# Patient Record
Sex: MALE | Race: WHITE
[De-identification: names, ages, dates, MRNs, and addresses within clinical notes are randomized per-mention and may not be internally consistent; named-entity substitution may affect disease eponyms.]

---

## 2018-05-26 ENCOUNTER — HOSPITAL ENCOUNTER (EMERGENCY)
Dept: HOSPITAL 25 - ED | Age: 68
Discharge: HOME | End: 2018-05-26
Payer: MEDICARE

## 2018-05-26 VITALS — DIASTOLIC BLOOD PRESSURE: 80 MMHG | SYSTOLIC BLOOD PRESSURE: 123 MMHG
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
## 2018-05-26 DIAGNOSIS — S61.210A: Primary | ICD-10-CM
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
## 2018-05-26 PROCEDURE — 12001 RPR S/N/AX/GEN/TRNK 2.5CM/<: CPT

## 2018-05-26 PROCEDURE — 99281 EMR DPT VST MAYX REQ PHY/QHP: CPT

## 2018-05-27 NOTE — ED
Laceration/Wound HPI





- HPI Summary


HPI Summary: 





Patient is a 67-year-old male presenting to the ED with a right index finger 

laceration from a stone.  The area was cleaned and dressed with ice in triage.  

Patient is not on any anticoagulation medications.  The area is approximately 2 

cm in length, 0.2 cm in depth and 0.3 cm at the widest area.  The area of 

laceration extends up into the tip of the finger but does not involve the nail 

or nailbed.  Endorses a mild amount of pain.  Bleeding is controlled on 

arrival.  There is slight bruising, but patient denies any numbness or tingling 

to the area.  He is able to flex and extend at the PIP and DIP.








- History of Current Complaint


Stated Complaint: RT FINGER LAC


Time Seen by Provider: 05/26/18 15:05


Hx Obtained From: Patient


Mechanism of Injury: Sharp/Blunt Trauma


Onset/Duration: Sudden Onset


Aggravating: Movement


Alleviating: Compression


Timing: Constant


Onset Severity: Mild


Current Severity: None


Pain Intensity: 0


Pain Scale Used: 0-10 Numeric


Associated Signs & Symptoms: Negative


Related Hx: Dominant Hand (Right)





- Allergy/Home Medications


Allergies/Adverse Reactions: 


 Allergies











Allergy/AdvReac Type Severity Reaction Status Date / Time


 


No Known Allergies Allergy   Verified 05/26/18 14:48














PMH/Surg Hx/FS Hx/Imm Hx


Previously Healthy: Yes





- Immunization History


Hx Pertussis Vaccination: No


Immunizations Up to Date: Unable to Obtain/Confirm


Infectious Disease History: No


Infectious Disease History: 


   Denies: Traveled Outside the [place] in Last 30 Days





- Social History


Occupation: Employed Full-time


Alcohol Use: Occasionally


Hx Substance Use: No


Substance Use Type: Reports: None


Hx Tobacco Use: No


Smoking Status (MU): Never Smoked Tobacco





Review of Systems


Constitutional: Negative


Negative: Fever, Chills, Fatigue


ENT: Negative


Cardiovascular: Negative


Genitourinary: Negative


Positive: no symptoms reported, see HPI


Musculoskeletal: Negative


Positive: Other - laceration to the index finger


Neurological: Negative


All Other Systems Reviewed And Are Negative: Yes





Physical Exam


Triage Information Reviewed: Yes


Vital Signs On Initial Exam: 


 Initial Vitals











Temp Pulse Resp BP Pulse Ox


 


 97 F   101   16   136/98   100 


 


 05/26/18 14:49  05/26/18 14:49  05/26/18 14:49  05/26/18 14:49  05/26/18 14:49











Vital Signs Reviewed: Yes


Appearance: Positive: Well-Appearing, Well-Nourished


Skin: Positive: Warm, Skin Color Reflects Adequate Perfusion


Head/Face: Positive: Normal Head/Face Inspection


Eyes: Positive: EOMI, KEN


Neck: Positive: Nontender


Respiratory/Lung Sounds: Positive: Breath Sounds Present


Cardiovascular: Positive: Pulses are Symmetrical in both Upper and Lower 

Extremities


Musculoskeletal: Positive: Normal, Strength/ROM Intact - E


Neurological: Positive: Sensory/Motor Intact, Alert, Oriented to Person Place, 

Time, Speech Normal


Psychiatric: Positive: Normal, Affect/Mood Appropriate





Procedures





- Laceration/Wound Repair


  ** 1


Location: upper extremity


Description: Irregular


Anesthesia: 1.0%


Betadine Prep?: No


Irrigated w/ Saline (ccs): 10


Laceration/Wound Explored: clean


Closure: Single Layer


Suture Type: Prolene


Number of Sutures: 7


Layer Closure?: No


Sterile Dressing Applied?: No





Diagnostics





- Vital Signs


 Vital Signs











  Temp Pulse Resp BP Pulse Ox


 


 05/26/18 16:03  97.7 F  71  18  123/80  100


 


 05/26/18 14:49  97 F  101  16  136/98  100














- Laboratory


Lab Statement: Any lab studies that have been ordered have been reviewed, and 

results considered in the medical decision making process.





Laceration Repair Course/Dx





- Course


Course Of Treatment: Time out obtained.  Cleansed wound thoroughly.  Digital 

block using 2 ml lidocaine without epi with good effect.  Bleeding is 

controlled.  7, 4-0 sutures placed with 2 protruding through the nailbed.  

Xeroform Occlusive gauze placed.  Tube gauze applied.  Patient will have suture 

removal in 7 days.





- Clinical Impression


Provider Diagnoses: 


 Laceration








Discharge





- Sign-Out/Discharge


Documenting (check all that apply): Discharge/Admit/Transfer





- Discharge Plan


Condition: Stable


Disposition: HOME


Patient Education Materials:  Care For Your Stitches (ED), Laceration (ED)


Referrals: 


Zohra Stuart MD [Primary Care Provider] - 


Additional Instructions: 


Suture removal in 6-8 days


Keep the area until tomorrow


you may take the bandage off at that time and leave open to air


Rinse with soap and water daily - do not scrub


Ibuprofen 600mg three times daily for any discomfort





- Billing Disposition and Condition


Condition: STABLE


Disposition: HOME